# Patient Record
Sex: FEMALE | Race: WHITE | NOT HISPANIC OR LATINO | URBAN - METROPOLITAN AREA
[De-identification: names, ages, dates, MRNs, and addresses within clinical notes are randomized per-mention and may not be internally consistent; named-entity substitution may affect disease eponyms.]

---

## 2024-05-08 ENCOUNTER — OFFICE VISIT (OUTPATIENT)
Dept: URGENT CARE | Facility: CLINIC | Age: 39
End: 2024-05-08

## 2024-05-08 VITALS
DIASTOLIC BLOOD PRESSURE: 73 MMHG | TEMPERATURE: 98.7 F | HEART RATE: 89 BPM | BODY MASS INDEX: 29.88 KG/M2 | SYSTOLIC BLOOD PRESSURE: 126 MMHG | RESPIRATION RATE: 15 BRPM | HEIGHT: 64 IN | OXYGEN SATURATION: 98 % | WEIGHT: 175 LBS

## 2024-05-08 DIAGNOSIS — J02.9 SORE THROAT: ICD-10-CM

## 2024-05-08 DIAGNOSIS — J06.9 ACUTE URI: Primary | ICD-10-CM

## 2024-05-08 PROCEDURE — 99213 OFFICE O/P EST LOW 20 MIN: CPT

## 2024-05-08 PROCEDURE — 87070 CULTURE OTHR SPECIMN AEROBIC: CPT

## 2024-05-08 NOTE — PROGRESS NOTES
[facility]        NAME: Christina Oviedo is a 38 y.o. female  : 1985    MRN: [id number]  DATE: May 8, 2024  TIME: 10:57 AM    Assessment and Plan   Acute URI [J06.9]  1. Acute URI        2. Sore throat  Throat culture            Patient Instructions   Rapid strep was negative.  Pending throat culture.  Use Tylenol as needed.  Recommend use of Robitussin as needed for cough  Recommend use of Zyrtec or Allegra as needed for congestion and runny nose  Follow up with PCP in 3-5 days.  Proceed to  ER if symptoms worsen.    If tests have been performed at [facility], our office will contact you with results if changes need to be made to the care plan discussed with you at the visit.  [facility]    Chief Complaint     Chief Complaint   Patient presents with    Sore Throat     Pt reports that she had  a sore throat and fever- nephew was just diagnosed with strep throat. Pt reports that she is pregnant as well.          History of Present Illness       37 y/o F who is 17 weeks pregnant, presents for sore throat x 2 days.  Patient when she was recently around her nephew who was diagnosed with strep via rapid test 5 days ago.  Did have a fever yesterday.  Tmax of 101.  Using Tylenol as needed.  Last dose was yesterday.  Also admits to runny nose, congestion, coughing.        Review of Systems   Review of Systems   Constitutional:  Positive for fever. Negative for chills.   HENT:  Positive for congestion, rhinorrhea and sore throat. Negative for ear pain and postnasal drip.    Respiratory:  Positive for cough.          Current Medications       Current Outpatient Medications:     Ferrous Sulfate (IRON PO), Take 1 tablet by mouth daily, Disp: , Rfl:     Current Allergies     Allergies as of 2024    (No Known Allergies)            The following portions of the patient's history were reviewed and updated as appropriate: allergies, current medications, past family  "history, past medical history, past social history, past surgical history and problem list.     History reviewed. No pertinent past medical history.    No past surgical history on file.    No family history on file.      Medications have been verified.        Objective   /73   Pulse 89   Temp 98.7 °F (37.1 °C)   Resp 15   Ht 5' 4\" (1.626 m)   Wt 79.4 kg (175 lb)   SpO2 98%   BMI 30.04 kg/m²   No LMP recorded. Patient is pregnant.       Physical Exam     Physical Exam  Vitals and nursing note reviewed.   Constitutional:       General: She is not in acute distress.     Appearance: She is not toxic-appearing.   HENT:      Head: Normocephalic and atraumatic.      Right Ear: Tympanic membrane, ear canal and external ear normal.      Left Ear: Tympanic membrane, ear canal and external ear normal.      Nose: Nose normal.      Mouth/Throat:      Mouth: Mucous membranes are moist.      Pharynx: No oropharyngeal exudate or posterior oropharyngeal erythema.   Eyes:      Conjunctiva/sclera: Conjunctivae normal.   Pulmonary:      Effort: Pulmonary effort is normal.      Breath sounds: Normal breath sounds.   Lymphadenopathy:      Cervical: No cervical adenopathy.   Neurological:      Mental Status: She is alert.   Psychiatric:         Mood and Affect: Mood normal.         Behavior: Behavior normal.                   "

## 2024-05-08 NOTE — PATIENT INSTRUCTIONS
Rapid strep was negative.  Pending throat culture.  Use Tylenol as needed.  Recommend use of Robitussin as needed for cough  Recommend use of Zyrtec or Allegra as needed for congestion and runny nose  Follow-up with PCP in 3 to 5 days  Proceed to the ER if symptoms worsen or for any concerns  If tests have been performed at [facility], our office will contact you with results if changes need to be made to the care plan discussed with you at the visit.  You can review your full results on [facility].

## 2024-05-10 LAB — BACTERIA THROAT CULT: NORMAL
